# Patient Record
Sex: MALE | Race: OTHER | HISPANIC OR LATINO
[De-identification: names, ages, dates, MRNs, and addresses within clinical notes are randomized per-mention and may not be internally consistent; named-entity substitution may affect disease eponyms.]

---
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
## 2024-02-11 NOTE — ED PROVIDER NOTE - CPE EDP NEURO NORM
Patient states his pharmacy did not receive his medication and patient would like to have it sent again. Writer able to locate medication in EMR and sends it over to patient's preferred pharmacy that is open on the weekend.     Outpatient Medication Detail     Disp Refills Start End    metoPROLOL succinate (TOPROL-XL) 50 MG 24 hr tablet 30 tablet 3 2/11/2024 --    Sig - Route: Take 1 tablet by mouth daily. - Oral    Sent to pharmacy as: Metoprolol Succinate ER 50 MG Oral Tablet Extended Release 24 Hour (TOPROL-XL)    Class: Eprescribe    E-Prescribing Status: Receipt confirmed by pharmacy (2/11/2024 12:36 PM CST)        Pharmacy    [id number] - [place] [address] AT [facility] [address] [place] [address] [address] [address]  Phone: [phone]  Fax: [phone]  AGUEDA #: [id number]     Writer calls pharmacy and confirms they received medication, pharmacy states they will start working on it at this time and contact patient once it is ready. Patient aware medication has been sent, and aware he can call back with any further questions or concerns.     Reason for Disposition   [1] Prescription prescribed recently is not at pharmacy AND [2] triager has access to patient's EMR AND [3] prescription is recorded in the EMR    Protocols used: Medication Refill and Renewal Call-A-     normal...
